# Patient Record
(demographics unavailable — no encounter records)

---

## 2025-01-31 NOTE — DISCUSSION/SUMMARY
[FreeTextEntry1] : 1. Chest Pain: don't strongly suspect cardiac etiology. Will obtain echo to evaluate for structural heart disease. Recommend vaping cessation since this seems to be a trigger  Office will call with results.

## 2025-01-31 NOTE — HISTORY OF PRESENT ILLNESS
[FreeTextEntry1] : 31 year old male with no significant PMHx, however, he vapes and smokes marijuana nightly, presents with a 2 month history of left sided chest pain, described as an ache. He states he goes to the gym, lifts weight and does cardio. This gives him absolutely no symptoms. When he vapes it seems to increase or bring on the pain.  There is no history of MI, CVA, CHF, or previous coronary intervention.  There is no known family history in first degree relatives of cardiovascular disease.